# Patient Record
Sex: MALE | Race: OTHER | Employment: UNEMPLOYED | URBAN - METROPOLITAN AREA
[De-identification: names, ages, dates, MRNs, and addresses within clinical notes are randomized per-mention and may not be internally consistent; named-entity substitution may affect disease eponyms.]

---

## 2019-05-29 ENCOUNTER — APPOINTMENT (OUTPATIENT)
Dept: GENERAL RADIOLOGY | Age: 29
End: 2019-05-29

## 2019-05-29 ENCOUNTER — HOSPITAL ENCOUNTER (EMERGENCY)
Age: 29
Discharge: HOME | End: 2019-05-29

## 2019-05-29 VITALS
DIASTOLIC BLOOD PRESSURE: 71 MMHG | OXYGEN SATURATION: 97 % | TEMPERATURE: 98.1 F | RESPIRATION RATE: 16 BRPM | HEART RATE: 60 BPM | SYSTOLIC BLOOD PRESSURE: 118 MMHG

## 2019-05-29 DIAGNOSIS — Z87.898 HISTORY OF EPISTAXIS: ICD-10-CM

## 2019-05-29 DIAGNOSIS — J30.2 SEASONAL ALLERGIES: Primary | ICD-10-CM

## 2019-05-29 PROCEDURE — 99283 EMERGENCY DEPT VISIT LOW MDM: CPT

## 2019-05-29 PROCEDURE — 71046 X-RAY EXAM CHEST 2 VIEWS: CPT

## 2019-05-29 RX ORDER — CETIRIZINE HYDROCHLORIDE 10 MG/1
10 TABLET ORAL DAILY
Qty: 30 TABLET | Refills: 0 | Status: SHIPPED | OUTPATIENT
Start: 2019-05-29 | End: 2019-06-28

## 2019-05-29 SDOH — HEALTH STABILITY: MENTAL HEALTH: HOW OFTEN DO YOU HAVE A DRINK CONTAINING ALCOHOL?: NEVER

## 2019-05-29 ASSESSMENT — ENCOUNTER SYMPTOMS
SINUS PRESSURE: 1
NAUSEA: 0
COUGH: 1
CHEST TIGHTNESS: 0
BACK PAIN: 0
SINUS PAIN: 1
ABDOMINAL PAIN: 0
SHORTNESS OF BREATH: 0
EYE ITCHING: 1
VOMITING: 0
DIARRHEA: 0
TROUBLE SWALLOWING: 0
SORE THROAT: 1

## 2019-05-29 NOTE — ED NOTES
Reviewed discharge instructions, home meds, and follow up care with patient, verbalized understanding.   Laine Bueno, RN  05/29/19 [id number]

## 2019-05-29 NOTE — ED PROVIDER NOTES
**EVALUATED BY ADVANCED PRACTICE PROVIDER**        [facility] [address] [facility] ENCOUN[facility]      Pt Name: Hakeem Ortega  FLU:[id number]  Dianna 1990  Date of evaluation: 5/29/2019  Provider: Farzana Winston PA-C      Chief Complaint:    Chief Complaint   Patient presents with    Cough     for 5 days has noticed blood in his nose and throat. also reports cough/congestion. reports that he also has been feeling very hot and has been working outside. no nose bleed during triage.  #[id number]    Epistaxis       Nursing Notes, Past Medical Hx, Past Surgical Hx, Social Hx, Allergies, and Family Hx were all reviewed and agreed with or any disagreements were addressed in the HPI.    HPI:  (Location, Duration, Timing, Severity,Quality, Assoc Sx, Context, Modifying factors)  This is a  25 y.o. male as is the emergency Department with difficulties as a pertains to nasal congestion, mild sore throat pain, mild cough as well as intermittent epistaxis. It is reported that the patient has recently relocated to the area. He has been having the above mentioned difficulties. He states he has not had fevers and or chills. He has had exposures that seem to be worsening when he is outside. He goes on to report he has had some intermittent epistaxis now for approximate 5 days. He states the last was just prior to arrival.  He states often times he will use a cold rag over his head and will make his nosebleeds go away. He goes on to report the cough that he has is nonproductive. He denies any he's had injury trauma and or pain. He states he's done nothing yet for the above-mentioned. Patient is on to report he is not anticoagulated. He has not taking excessive nonsteroidal anti-inflammatory medications. He goes on to state he has not having any chest pain, palpitations, lightheadedness, or shortness of breath. He has no additional GI or  complaints.   He reports that he is eating and drinking without difficulty and is having a problem swallowing. He describes scratchy nature in his nose as well as throat. He has had some intermittent eye itching as well. Patient states that he has never been diagnosed with seasonal allergies upon lessening utilizing a . PastMedical/Surgical History:  History reviewed. No pertinent past medical history. History reviewed. No pertinent surgical history. Medications:  Previous Medications    No medications on file         Review of Systems:  Review of Systems   Constitutional: Negative for activity change, chills and fever. HENT: Positive for congestion, sinus pressure, sinus pain, sneezing and sore throat. Negative for trouble swallowing. Eyes: Positive for itching. Respiratory: Positive for cough. Negative for chest tightness and shortness of breath. Cardiovascular: Negative for chest pain. Gastrointestinal: Negative for abdominal pain, diarrhea, nausea and vomiting. Genitourinary: Negative for dysuria and flank pain. Musculoskeletal: Negative for back pain and myalgias. Skin: Negative for rash and wound. Neurological: Negative for dizziness, numbness and headaches. Hematological: Does not bruise/bleed easily. Positives and Pertinent negatives as per HPI. Except as noted above in the ROS, problem specific ROS was completed and is negative. Physical Exam:  Physical Exam   Constitutional: He is oriented to person, place, and time. He appears well-developed and well-nourished. He is active and cooperative. No distress. HENT:   Head: Normocephalic and atraumatic. Head is without raccoon's eyes and without Salazar's sign. Right Ear: Hearing, tympanic membrane, external ear and ear canal normal.   Left Ear: Hearing, tympanic membrane, external ear and ear canal normal.   Nose: Mucosal edema present. No sinus tenderness, nasal deformity, septal deviation or nasal septal hematoma. No epistaxis. cough r/o pna TECHNOLOGIST PROVIDED HISTORY: Reason for exam:->cough r/o pna Ordering Physician Provided Reason for Exam: Cough (for 5 days has noticed blood in his nose and throat. also reports cough/congestion. reports that he also has been feeling very hot and has been working outside. no nose bleed during triage.  #[id number]) Acuity: Unknown Type of Exam: Unknown FINDINGS: The lungs are without acute focal process. There is no effusion or pneumothorax. The cardiomediastinal silhouette is without acute process. The osseous structures are without acute process. No acute process. MEDICAL DECISION MAKING / ED COURSE:      PROCEDURES:   Procedures    None    Patient was given:  Medications - No data to display    The patient's detailed history of present illness is documented as above. Upon arrival to the emergency department the patient's vital signs are as documented. The patient is noted to be hemodynamically stable and afebrile. Physical examination findings are as above. There is no active bleeding of either nares at the present time despite the patient's history. Clinically he does present with symptoms most consistent with that likely related to seasonal allergies. His chest x-rays completed as above demonstrating no evidence of acute cardiopulmonary process. Once again utilizing the  I discussed this with the patient in detail and going to suggest treating him for his seasonal allergies with severe tach and will make arrangements for him to have a primary care physician as well. The patient has been made aware of the signs and symptoms which would necessitate an immediate return to the emergency department and verbalizes an understanding of these signs and symptoms.     There is no current evidence to suggest sepsis, meningitis, epiglottitis, pneumonia, acute bacterial sinusitis, streptococcal pharyngitis, otitis media, or other bacterial infection that would be

## 2019-05-29 NOTE — ED NOTES
Reviewed discharge instructions, home meds, and follow up care with patient, verbalized understanding.       Rodriguez Carballo RN  05/29/19 [id number]